# Patient Record
Sex: FEMALE | Race: WHITE | NOT HISPANIC OR LATINO | URBAN - METROPOLITAN AREA
[De-identification: names, ages, dates, MRNs, and addresses within clinical notes are randomized per-mention and may not be internally consistent; named-entity substitution may affect disease eponyms.]

---

## 2017-10-27 ENCOUNTER — OFFICE VISIT (OUTPATIENT)
Dept: RETAIL CLINIC | Facility: CLINIC | Age: 27
End: 2017-10-27

## 2017-10-27 VITALS
DIASTOLIC BLOOD PRESSURE: 62 MMHG | OXYGEN SATURATION: 98 % | HEART RATE: 64 BPM | SYSTOLIC BLOOD PRESSURE: 102 MMHG | RESPIRATION RATE: 18 BRPM | TEMPERATURE: 97.8 F

## 2017-10-27 DIAGNOSIS — N61.0 MASTITIS, ACUTE: Primary | ICD-10-CM

## 2017-10-27 PROCEDURE — 99203 OFFICE O/P NEW LOW 30 MIN: CPT | Performed by: NURSE PRACTITIONER

## 2017-10-27 RX ORDER — AMOXICILLIN AND CLAVULANATE POTASSIUM 875; 125 MG/1; MG/1
1 TABLET, FILM COATED ORAL 2 TIMES DAILY
Qty: 20 TABLET | Refills: 0 | Status: SHIPPED | OUTPATIENT
Start: 2017-10-27 | End: 2017-11-06

## 2017-10-27 NOTE — PATIENT INSTRUCTIONS
Mastitis  Mastitis is inflammation of the breast tissue. It occurs most often in women who are breastfeeding, but it can also affect other women, and even sometimes men.  CAUSES   Mastitis is usually caused by a bacterial infection. Bacteria enter the breast tissue through cuts or openings in the skin. Typically, this occurs with breastfeeding because of cracked or irritated skin. Sometimes, it can occur even when there is no opening in the skin. It can be associated with plugged milk (lactiferous) ducts. Nipple piercing can also lead to mastitis. Also, some forms of breast cancer can cause mastitis.  SIGNS AND SYMPTOMS   · Swelling, redness, tenderness, and pain in an area of the breast.  · Swelling of the glands under the arm on the same side.  · Fever.  If an infection is allowed to progress, a collection of pus (abscess) may develop.  DIAGNOSIS   Your health care provider can usually diagnose mastitis based on your symptoms and a physical exam. Tests may be done to help confirm the diagnosis. These may include:   · Removal of pus from the breast by applying pressure to the area. This pus can be examined in the lab to determine which bacteria are present. If an abscess has developed, the fluid in the abscess can be removed with a needle. This can also be used to confirm the diagnosis and determine the bacteria present. In most cases, pus will not be present.  · Blood tests to determine if your body is fighting a bacterial infection.  · Mammogram or ultrasound tests to rule out other problems or diseases.  TREATMENT   Antibiotic medicine is used to treat a bacterial infection. Your health care provider will determine which bacteria are most likely causing the infection and will select an appropriate antibiotic. This is sometimes changed based on the results of tests performed to identify the bacteria, or if there is no response to the antibiotic selected. Antibiotics are usually given by mouth. You may also be  given medicine for pain.  Mastitis that occurs with breastfeeding will sometimes go away on its own, so your health care provider may choose to wait 24 hours after first seeing you to decide whether a prescription medicine is needed.  HOME CARE INSTRUCTIONS   · Only take over-the-counter or prescription medicines for pain, fever, or discomfort as directed by your health care provider.  · If your health care provider prescribed an antibiotic, take the medicine as directed. Make sure you finish it even if you start to feel better.  · Do not wear a tight or underwire bra. Wear a soft, supportive bra.  · Increase your fluid intake, especially if you have a fever.  · Women who are breastfeeding should follow these instructions:    Continue to empty the breast. Your health care provider can tell you whether this milk is safe for your infant or needs to be thrown out. You may be told to stop nursing until your health care provider thinks it is safe for your baby. Use a breast pump if you are advised to stop nursing.    Keep your nipples clean and dry.    Empty the first breast completely before going to the other breast. If your baby is not emptying your breasts completely for some reason, use a breast pump to empty your breasts.    If you go back to work, pump your breasts while at work to stay in time with your nursing schedule.    Avoid allowing your breasts to become overly filled with milk (engorged).  SEEK MEDICAL CARE IF:   · You have pus-like discharge from the breast.  · Your symptoms do not improve with the treatment prescribed by your health care provider within 2 days.  SEEK IMMEDIATE MEDICAL CARE IF:   · Your pain and swelling are getting worse.  · You have pain that is not controlled with medicine.  · You have a red line extending from the breast toward your armpit.  · You have a fever or persistent symptoms for more than 2-3 days.  · You have a fever and your symptoms suddenly get worse.     This information  is not intended to replace advice given to you by your health care provider. Make sure you discuss any questions you have with your health care provider.     Document Released: 12/18/2006 Document Revised: 12/23/2014 Document Reviewed: 07/18/2014  [employer] Interactive Patient Education ©2017 [employer]

## 2017-10-27 NOTE — PROGRESS NOTES
Subjective   Mandeep Del Castillo is a 26 y.o. female.     HPI Comments: Patient is 2 months postpartum, exclusively breastfeeding. 5 days ago developed right breast tenderness, redness, and flu like symptoms. Her breast is firm and this does not change after feeding child. She has noticed a decrease in milk supply in right breast by about half of what is normal. She is visiting the [place] with her  and child and returning home to [place] in 2 days.    Breast Pain   This is a new problem. Episode onset: 5 days ago. The problem occurs constantly. The problem has been unchanged. Associated symptoms include chills, fatigue, a fever, myalgias and swollen glands. Pertinent negatives include no abdominal pain, anorexia, change in bowel habit, chest pain, coughing, diaphoresis, headaches, joint swelling, nausea, neck pain, rash, sore throat, urinary symptoms, vertigo, visual change or vomiting. Nothing aggravates the symptoms. She has tried acetaminophen and NSAIDs (breast feeding/pumping more frequently, increase H2O intake) for the symptoms. The treatment provided no relief.       The following portions of the patient's history were reviewed and updated as appropriate: allergies, current medications, past family history, past medical history, past social history, past surgical history and problem list.    Review of Systems   Constitutional: Positive for chills, fatigue and fever. Negative for appetite change and diaphoresis.   HENT: Negative for facial swelling, mouth sores, sore throat and trouble swallowing.    Respiratory: Negative for cough, chest tightness, shortness of breath, wheezing and stridor.    Cardiovascular: Negative for chest pain and palpitations.   Gastrointestinal: Negative for abdominal pain, anorexia, change in bowel habit, constipation, diarrhea, nausea and vomiting.   Genitourinary: Negative for decreased urine volume and vaginal bleeding.   Musculoskeletal: Positive for myalgias. Negative for joint  swelling, neck pain and neck stiffness.   Skin: Positive for color change. Negative for rash and wound.   Allergic/Immunologic: Negative for environmental allergies and food allergies.   Neurological: Negative for dizziness, vertigo and headaches.       Objective   Physical Exam   Constitutional: She is oriented to person, place, and time. She appears well-developed and well-nourished. She is cooperative.  Non-toxic appearance. She does not appear ill. No distress.   Cardiovascular: Normal rate, regular rhythm, S1 normal and S2 normal.    Pulmonary/Chest: Effort normal and breath sounds normal.   Neurological: She is alert and oriented to person, place, and time.   Skin: Skin is warm and dry. She is not diaphoretic. There is erythema. No pallor.        Vitals reviewed.      Assessment/Plan   Mandeep was seen today for breast pain.    Diagnoses and all orders for this visit:    Mastitis, acute  -     amoxicillin-clavulanate (AUGMENTIN) 875-125 MG per tablet; Take 1 tablet by mouth 2 (Two) Times a Day for 10 days.          -     Breast feed/pump at least every 3 hours        -     Pump/feed with right breast first        -     Eat one cup of yogurt daily while taking antibiotic        -     May continue to take tylenol/ibuprofen per box instructions for pain/fever relief        -     Follow up with PCP or urgent treatment for persistent or worsening symptoms